# Patient Record
Sex: MALE | Race: WHITE
[De-identification: names, ages, dates, MRNs, and addresses within clinical notes are randomized per-mention and may not be internally consistent; named-entity substitution may affect disease eponyms.]

---

## 2017-04-01 ENCOUNTER — HOSPITAL ENCOUNTER (EMERGENCY)
Dept: HOSPITAL 26 - MED | Age: 32
Discharge: HOME | End: 2017-04-01
Payer: MEDICAID

## 2017-04-01 VITALS — DIASTOLIC BLOOD PRESSURE: 87 MMHG | SYSTOLIC BLOOD PRESSURE: 132 MMHG

## 2017-04-01 VITALS — DIASTOLIC BLOOD PRESSURE: 90 MMHG | SYSTOLIC BLOOD PRESSURE: 152 MMHG

## 2017-04-01 VITALS — HEIGHT: 66 IN | WEIGHT: 190 LBS | BODY MASS INDEX: 30.53 KG/M2

## 2017-04-01 DIAGNOSIS — F10.129: Primary | ICD-10-CM
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
## 2017-04-01 PROCEDURE — 85610 PROTHROMBIN TIME: CPT

## 2017-04-01 PROCEDURE — 85025 COMPLETE CBC W/AUTO DIFF WBC: CPT

## 2017-04-01 PROCEDURE — 99284 EMERGENCY DEPT VISIT MOD MDM: CPT

## 2017-04-01 PROCEDURE — 36415 COLL VENOUS BLD VENIPUNCTURE: CPT

## 2017-04-01 PROCEDURE — 96374 THER/PROPH/DIAG INJ IV PUSH: CPT

## 2017-04-01 PROCEDURE — 96361 HYDRATE IV INFUSION ADD-ON: CPT

## 2017-04-01 PROCEDURE — 80053 COMPREHEN METABOLIC PANEL: CPT

## 2017-04-01 NOTE — NUR
Patient discharged with v/s stable. Written and verbal after care instructions 
given and explained. Patient alert, oriented and verbalized understanding of 
instructions. Ambulatory with steady gait. All questions addressed prior to 
discharge. ID band removed. Patient advised to follow up with PMD. Rx of ATIVAN 
1MG  given. Patient educated on indication of medication including possible 
reaction and side effects. Opportunity to ask questions provided and 
answered.PT STATES HE CALLED HIS MOM, SHE WILL COME AND PICK HIM UP

## 2017-04-01 NOTE — NUR
PATIENT PRESENTS TO ED WITH ALCOHOL WITHDRAWAL SYMPTOMS . PT STATES HE HAD 3 
SEIZURES TODAY WHILE AT HOME, 1 FATHER WAS PRESENT AND 2 MOM WAS PRESENT X 30 
MIN FOR THE 1ST SEIZURE AND UNKNOWN TIME FOR THE 2 OTHERS . TONGUE IS NOT 
SWOLLEN OR RED ON ASSESSMENT AT THE MOMENT. PT STATES HE TAKE ATIVAN 2MG 3X A 
DAY FOR SEIZURE AND HAS BEEN OUT OF MEDICATION FOR 2 WEEKS AND SINCE THEN BEEN 
DRINKING ALCOHOL. PT DENIES N/V/D; SKIN IS PINK/WARM/DRY; AAOX4 WITH EVEN AND 
STEADY GAIT; LUNGS CLEAR BL; HR EVEN AND REGULAR; PT DENIES ANY FEVER, CP, SOB, 
OR COUGH AT THIS TIME; PATIENT STATES PAIN OF 0/10 AT THIS TIME; VSS; PATIENT 
POSITIONED FOR COMFORT; HOB ELEVATED; BEDRAILS UP X2; BED DOWN. ER MD MADE 
AWARE OF PT STATUS.

## 2017-04-01 NOTE — NUR
-------------------------------------------------------------------------------

            *** Note undone in [facility] - 04/01/17 at 0146 by HARINDER ***             

-------------------------------------------------------------------------------

Patient to bed 07.

## 2017-10-15 ENCOUNTER — HOSPITAL ENCOUNTER (INPATIENT)
Dept: HOSPITAL 26 - MED | Age: 32
LOS: 1 days | Discharge: HOME | End: 2017-10-16
Payer: MEDICAID

## 2017-10-15 VITALS — DIASTOLIC BLOOD PRESSURE: 80 MMHG | SYSTOLIC BLOOD PRESSURE: 131 MMHG

## 2017-10-15 VITALS — SYSTOLIC BLOOD PRESSURE: 123 MMHG | DIASTOLIC BLOOD PRESSURE: 82 MMHG

## 2017-10-15 VITALS — WEIGHT: 174.03 LBS | BODY MASS INDEX: 27.97 KG/M2 | HEIGHT: 66 IN

## 2017-10-15 DIAGNOSIS — F41.9: ICD-10-CM

## 2017-10-15 DIAGNOSIS — E87.1: ICD-10-CM

## 2017-10-15 DIAGNOSIS — F10.239: Primary | ICD-10-CM

## 2017-10-15 DIAGNOSIS — E83.42: ICD-10-CM

## 2017-10-15 DIAGNOSIS — R56.9: ICD-10-CM

## 2017-10-15 LAB
ALBUMIN FLD-MCNC: 3.5 G/DL (ref 3.4–5)
ANION GAP SERPL CALCULATED.3IONS-SCNC: 11.6 MMOL/L (ref 8–16)
APPEARANCE UR: CLEAR
AST SERPL-CCNC: 43 U/L (ref 15–37)
BARBITURATES UR QL SCN: (no result) NG/ML
BENZODIAZ UR QL SCN: (no result) NG/ML
BILIRUB SERPL-MCNC: 0.5 MG/DL (ref 0–1)
BILIRUB UR QL STRIP: NEGATIVE
BUN SERPL-MCNC: 6 MG/DL (ref 7–18)
BZE UR QL SCN: (no result) NG/ML
CANNABINOIDS UR QL SCN: (no result) NG/ML
CHLORIDE SERPL-SCNC: 101 MMOL/L (ref 98–107)
CO2 SERPL-SCNC: 30.1 MMOL/L (ref 21–32)
COLOR UR: YELLOW
CREAT SERPL-MCNC: 0.7 MG/DL (ref 0.7–1.3)
ERYTHROCYTE [DISTWIDTH] IN BLOOD BY AUTOMATED COUNT: 18.8 % (ref 11.6–13.7)
GFR SERPL CREATININE-BSD FRML MDRD: 166 ML/MIN (ref 90–?)
GLUCOSE SERPL-MCNC: 106 MG/DL (ref 74–106)
GLUCOSE UR STRIP-MCNC: NEGATIVE MG/DL
HCT VFR BLD AUTO: 32.8 % (ref 36–52)
HGB BLD-MCNC: 9.7 G/DL (ref 12–18)
HGB UR QL STRIP: NEGATIVE
LEUKOCYTE ESTERASE UR QL STRIP: NEGATIVE
LYMPHOCYTES NFR BLD MANUAL: 41 % (ref 20–46)
MCH RBC QN AUTO: 17 PG (ref 27–31)
MCHC RBC AUTO-ENTMCNC: 30 G/DL (ref 33–37)
MCV RBC AUTO: 59 FL (ref 80–94)
MONOCYTES NFR BLD MANUAL: 4 % (ref 5–12)
NITRITE UR QL STRIP: NEGATIVE
OPIATES UR QL SCN: (no result) NG/ML
PCP UR QL SCN: (no result) NG/ML
PH UR STRIP: 5.5 [PH] (ref 5–9)
PLATELET # BLD AUTO: 231 K/UL (ref 140–450)
POTASSIUM SERPL-SCNC: 3.7 MMOL/L (ref 3.5–5.1)
RBC # BLD AUTO: 5.6 MIL/UL (ref 4.2–6.1)
SODIUM SERPL-SCNC: 139 MMOL/L (ref 136–145)
WBC # BLD AUTO: 7.2 K/UL (ref 4.8–10.8)

## 2017-10-15 PROCEDURE — G0482 DRUG TEST DEF 15-21 CLASSES: HCPCS

## 2017-10-15 RX ADMIN — DEXTROSE AND SODIUM CHLORIDE SCH MLS/HR: 5; .9 INJECTION, SOLUTION INTRAVENOUS at 22:25

## 2017-10-15 NOTE — NUR
PATIENT REQUESTED ICE CHIPS. PROVIDED PATIENT WITH 1 CUP OF ICE CHIPS. TOLERATED WELL. 
CONTINUE TO MONITOR PATIENT.

## 2017-10-15 NOTE — NUR
PATIENT IS A 33 Y/O MALE WHO PRESENTS TO THE ED C/O SEIZURES. PT STATES, "I GET 
THEM WHEN I STOP DRINKING ALCOHOL. PT DENIES PAIN, PT REPORTS SEIZURES X1 DAY, 
NO ORAL TRAUMA. PERRLA. PT REPORTS TIGHTNESS 7/10 CHEST PAIN THAT DOES NOT 
RADIATE. PT REPORTS DIARRHEA X1 DAY, DENIES N/V. PT AAOX4, RR EVEN/UNLABORED. 
PT REPOSITIONED FOR COMFORT, BED IN LOWEST POSITION. ER MD DR. PINO NOTIFIED. 
WILL CONTINUE TO MONITOR. 

-------------------------------------------------------------------------------

Addendum: 10/15/17 at 2211 by MEDDCV

-------------------------------------------------------------------------------

PATIENT IS A 33 Y/O MALE WHO PRESENTS TO THE ED C/O SEIZURES. PT STATES, "I GET 
THEM WHEN I STOP DRINKING ALCOHOL. PT DENIES PAIN, PT REPORTS SEIZURES X1 DAY, 
NO ORAL TRAUMA. PERRLA. SEIZURE PADS, X2 BEDRAILS UP. PT REPORTS TIGHTNESS 7/10 
CHEST PAIN THAT DOES NOT RADIATE. PT REPORTS DIARRHEA X1 DAY, DENIES N/V. PT 
AAOX4, RR EVEN/UNLABORED. PT REPOSITIONED FOR COMFORT, BED IN LOWEST POSITION. 
ER MD DR. PINO NOTIFIED. WILL CONTINUE TO MONITOR.

## 2017-10-15 NOTE — NUR
RECEIVED REPORT FROM VALERIE PRESCOTT. PATIENT ARRIVED FROM ER VIA [facility] WITH VALERIE RN, CHLOE RN, AND 
SIMONE EMT. PATIENT IS AWAKE, ALERT, AND ORIENTED. PATIENT SELF AMBULATED FROM ER GURNEY TO 
ICU BED 2 WITH NO SIGNS OF SOB OR DISTRESS NOTED. GAIT STEADY. ORIENTED PATIENT TO UNIT AND 
PLAN OF CARE VITAL SIGNS STABLE. THERE IS A #20 IN THE RIGHT AC. SITE IS DRY, INTACT, AND 
ASYMPTOMATIC. EXPLAINED PLAN OF CARE TONIGHT TO INCLUDE CARDIAC MONITORING, V/S Q2H, AND 
MEDICATION ADMINISTRATION. PATIENT VERBALIZED UNDERSTANDING. HOB AT 30 DEGREES WITH BED IN 
LOW POSITION. WILL CONTINUE TO MONITOR PATIENT.

## 2017-10-15 NOTE — NUR
Patient will be admitted to care of DR. CHAPIN. Admited to ICU.  Will go to room 
ICU 2. Belongings list completed.  Report to TOMMY PRESCOTT.

## 2017-10-15 NOTE — NUR
Patient noted to have existing wounds upon arrival to ER.  Photos taken of 
wound and placed in chart.  Wound covered with dressing.  Physician informed.

## 2017-10-16 VITALS — DIASTOLIC BLOOD PRESSURE: 74 MMHG | SYSTOLIC BLOOD PRESSURE: 134 MMHG

## 2017-10-16 VITALS — SYSTOLIC BLOOD PRESSURE: 118 MMHG | DIASTOLIC BLOOD PRESSURE: 61 MMHG

## 2017-10-16 VITALS — DIASTOLIC BLOOD PRESSURE: 60 MMHG | SYSTOLIC BLOOD PRESSURE: 124 MMHG

## 2017-10-16 VITALS — DIASTOLIC BLOOD PRESSURE: 66 MMHG | SYSTOLIC BLOOD PRESSURE: 111 MMHG

## 2017-10-16 VITALS — SYSTOLIC BLOOD PRESSURE: 118 MMHG | DIASTOLIC BLOOD PRESSURE: 66 MMHG

## 2017-10-16 VITALS — DIASTOLIC BLOOD PRESSURE: 66 MMHG | SYSTOLIC BLOOD PRESSURE: 114 MMHG

## 2017-10-16 VITALS — DIASTOLIC BLOOD PRESSURE: 77 MMHG | SYSTOLIC BLOOD PRESSURE: 128 MMHG

## 2017-10-16 VITALS — DIASTOLIC BLOOD PRESSURE: 86 MMHG | SYSTOLIC BLOOD PRESSURE: 148 MMHG

## 2017-10-16 VITALS — SYSTOLIC BLOOD PRESSURE: 132 MMHG | DIASTOLIC BLOOD PRESSURE: 92 MMHG

## 2017-10-16 VITALS — SYSTOLIC BLOOD PRESSURE: 121 MMHG | DIASTOLIC BLOOD PRESSURE: 86 MMHG

## 2017-10-16 LAB
ANION GAP SERPL CALCULATED.3IONS-SCNC: 7.5 MMOL/L (ref 8–16)
BUN SERPL-MCNC: 6 MG/DL (ref 7–18)
CHLORIDE SERPL-SCNC: 106 MMOL/L (ref 98–107)
CO2 SERPL-SCNC: 31.2 MMOL/L (ref 21–32)
CREAT SERPL-MCNC: 0.6 MG/DL (ref 0.7–1.3)
ERYTHROCYTE [DISTWIDTH] IN BLOOD BY AUTOMATED COUNT: 19.5 % (ref 11.6–13.7)
GFR SERPL CREATININE-BSD FRML MDRD: 198 ML/MIN (ref 90–?)
GLUCOSE SERPL-MCNC: 92 MG/DL (ref 74–106)
HCT VFR BLD AUTO: 31.1 % (ref 36–52)
HGB BLD-MCNC: 8.9 G/DL (ref 12–18)
LYMPHOCYTES NFR BLD MANUAL: 32 % (ref 20–46)
MAGNESIUM SERPL-MCNC: 1.7 MG/DL (ref 1.8–2.4)
MCH RBC QN AUTO: 17 PG (ref 27–31)
MCHC RBC AUTO-ENTMCNC: 29 G/DL (ref 33–37)
MCV RBC AUTO: 60 FL (ref 80–94)
MONOCYTES NFR BLD MANUAL: 6 % (ref 5–12)
PHOSPHATE SERPL-MCNC: 4.1 MG/DL (ref 2.5–4.9)
PLATELET # BLD AUTO: 222 K/UL (ref 140–450)
POTASSIUM SERPL-SCNC: 3.7 MMOL/L (ref 3.5–5.1)
RBC # BLD AUTO: 5.21 MIL/UL (ref 4.2–6.1)
SODIUM SERPL-SCNC: 141 MMOL/L (ref 136–145)
WBC # BLD AUTO: 4.7 K/UL (ref 4.8–10.8)

## 2017-10-16 RX ADMIN — DEXTROSE AND SODIUM CHLORIDE SCH MLS/HR: 5; .9 INJECTION, SOLUTION INTRAVENOUS at 11:09

## 2017-10-16 RX ADMIN — DEXTROSE AND SODIUM CHLORIDE SCH MLS/HR: 5; .9 INJECTION, SOLUTION INTRAVENOUS at 16:21

## 2017-10-16 NOTE — NUR
10/16/17 

RD INITIAL ASSESSMENT COMPLETED



PLEASE REFER TO NUTRITION ASSESSMENT UNDER CARE ACTIVITY FOR ESTIMATED NUTRITIONAL NEEDS. 



1. CONTINUE REGULAR DIET

2. ENCOURAGE INCREASED PO INTAKE TO TOLERANCE

3. RD TO FOLLOW-UP 2-3 DAYS, HIGH RISK 



RACHELE SLATER RD

## 2017-10-16 NOTE — NUR
PATIENT HAS BEEN SCREENED AND CATEGORIZED AS HIGH NUTRITION RISK. PATIENT WILL BE SEEN 
WITHIN 1-2 DAYS OF ADMISSION.



10/16/17-10/17/17



RACHELE SLATER RD

## 2017-10-16 NOTE — NUR
PT IS STABLE. IV'S DC'D WITH CANNULA INTACT. WRIST BAND REMOVED. DISCHARGE INSTRUCTION GIVEN 
AND SIGNED BY PT'S WIFE. ALL PERSONAL BELONGINGS TAKEN BY PT AND LEFT WITH WIFE.

## 2017-10-16 NOTE — NUR
PAOLA NOTE

AS PER TINY GHOTRA OF [facility] PH# [phone], REVIEWS AND DISCHARGE NEEDS SHOULD ONLY BE 
SENT TO [facility] AND NOT TO [facility].

INITIAL REVIEW FAXED TO [facility] [phone] PH# [phone] PAOLA WILLIAM EXT [id number]

## 2017-10-16 NOTE — NUR
RECEIVED A REPORT FROM GENIE MORALES. PT IS ASLEEP AND UNABLE TO FOLLOW COMMANDS AT THIS 
TIME. ON O2 2L/MIN VIA N/C. NO S/SX OF RESPIRATORY DISTRESS AND FLACC 0 NOTED. SR ON THE 
MONITOR. SKIN WARM TO TOUCH. NO EDEMA. APPLIED SCD TO BLE FOR VTE PROPHYLAXIS. SAFETY 
PRECAUTION, BED IN LOW POSITION. CALL LIGHT WITHIN REACH. WILL CONTINUE TO MONITOR.

## 2017-10-16 NOTE — NUR
PATIENT SLEEPING COMFORTABLY IN BED WITH NO SIGNS OF DISTRESS OR DISCOMFORT NOTED. WILL 
CONTINUE TO MONITOR PATIENT.

## 2017-10-16 NOTE — NUR
PATIENT SLEEPING IN BED. BREATHING EVEN AND UNLABORED. HOB AT 30 DEGREES WITH BED IN LOW 
POSITION. SEIZURE PRECAUTIONS IN PLACE. CONTINUE TO MONITOR PATIENT.

## 2018-05-04 ENCOUNTER — HOSPITAL ENCOUNTER (EMERGENCY)
Dept: HOSPITAL 26 - MED | Age: 33
Discharge: HOME | End: 2018-05-04
Payer: MEDICAID

## 2018-05-04 VITALS — SYSTOLIC BLOOD PRESSURE: 134 MMHG | DIASTOLIC BLOOD PRESSURE: 93 MMHG

## 2018-05-04 VITALS — HEIGHT: 64 IN | WEIGHT: 182 LBS | BODY MASS INDEX: 31.07 KG/M2

## 2018-05-04 VITALS — SYSTOLIC BLOOD PRESSURE: 128 MMHG | DIASTOLIC BLOOD PRESSURE: 74 MMHG

## 2018-05-04 DIAGNOSIS — F10.129: Primary | ICD-10-CM

## 2018-05-04 DIAGNOSIS — F17.210: ICD-10-CM

## 2018-05-04 DIAGNOSIS — F41.9: ICD-10-CM

## 2018-05-04 DIAGNOSIS — G40.909: ICD-10-CM

## 2018-05-04 LAB
ALBUMIN FLD-MCNC: 3.9 G/DL (ref 3.4–5)
ANION GAP SERPL CALCULATED.3IONS-SCNC: 18.1 MMOL/L (ref 8–16)
APAP SERPL-MCNC: < 0.5 UG/ML (ref 10–30)
APPEARANCE UR: CLEAR
AST SERPL-CCNC: 91 U/L (ref 15–37)
BARBITURATES UR QL SCN: (no result) NG/ML
BENZODIAZ UR QL SCN: (no result) NG/ML
BILIRUB SERPL-MCNC: 0.6 MG/DL (ref 0–1)
BILIRUB UR QL STRIP: NEGATIVE
BUN SERPL-MCNC: 6 MG/DL (ref 7–18)
BZE UR QL SCN: (no result) NG/ML
CANNABINOIDS UR QL SCN: (no result) NG/ML
CHLORIDE SERPL-SCNC: 101 MMOL/L (ref 98–107)
CO2 SERPL-SCNC: 26.9 MMOL/L (ref 21–32)
COLOR UR: YELLOW
CREAT SERPL-MCNC: 0.7 MG/DL (ref 0.7–1.3)
ERYTHROCYTE [DISTWIDTH] IN BLOOD BY AUTOMATED COUNT: 22.2 % (ref 11.6–13.7)
GFR SERPL CREATININE-BSD FRML MDRD: 165 ML/MIN (ref 90–?)
GLUCOSE SERPL-MCNC: 103 MG/DL (ref 74–106)
GLUCOSE UR STRIP-MCNC: NEGATIVE MG/DL
HCT VFR BLD AUTO: 30.4 % (ref 36–52)
HGB BLD-MCNC: 8.8 G/DL (ref 12–18)
HGB UR QL STRIP: NEGATIVE
LEUKOCYTE ESTERASE UR QL STRIP: NEGATIVE
LIPASE SERPL-CCNC: 278 U/L (ref 73–393)
LYMPHOCYTES NFR BLD MANUAL: 46 % (ref 20–46)
MCH RBC QN AUTO: 15 PG (ref 27–31)
MCHC RBC AUTO-ENTMCNC: 29 G/DL (ref 33–37)
MCV RBC AUTO: 52.1 FL (ref 80–94)
MONOCYTES NFR BLD MANUAL: 6 % (ref 5–12)
NITRITE UR QL STRIP: NEGATIVE
OPIATES UR QL SCN: (no result) NG/ML
PCP UR QL SCN: (no result) NG/ML
PH UR STRIP: 5.5 [PH] (ref 5–9)
PLATELET # BLD AUTO: 205 K/UL (ref 140–450)
POTASSIUM SERPL-SCNC: 4 MMOL/L (ref 3.5–5.1)
RBC # BLD AUTO: 5.83 MIL/UL (ref 4.2–6.1)
SALICYLATES SERPL-MCNC: < 2.8 MG/DL (ref 2.8–20)
SODIUM SERPL-SCNC: 142 MMOL/L (ref 136–145)
WBC # BLD AUTO: 3.5 K/UL (ref 4.8–10.8)

## 2018-05-04 PROCEDURE — 36415 COLL VENOUS BLD VENIPUNCTURE: CPT

## 2018-05-04 PROCEDURE — 93005 ELECTROCARDIOGRAM TRACING: CPT

## 2018-05-04 PROCEDURE — G0482 DRUG TEST DEF 15-21 CLASSES: HCPCS

## 2018-05-04 PROCEDURE — 71045 X-RAY EXAM CHEST 1 VIEW: CPT

## 2018-05-04 PROCEDURE — 81003 URINALYSIS AUTO W/O SCOPE: CPT

## 2018-05-04 PROCEDURE — 80053 COMPREHEN METABOLIC PANEL: CPT

## 2018-05-04 PROCEDURE — 84484 ASSAY OF TROPONIN QUANT: CPT

## 2018-05-04 PROCEDURE — 85025 COMPLETE CBC W/AUTO DIFF WBC: CPT

## 2018-05-04 PROCEDURE — 96360 HYDRATION IV INFUSION INIT: CPT

## 2018-05-04 PROCEDURE — 80305 DRUG TEST PRSMV DIR OPT OBS: CPT

## 2018-05-04 PROCEDURE — G0480 DRUG TEST DEF 1-7 CLASSES: HCPCS

## 2018-05-04 PROCEDURE — 99285 EMERGENCY DEPT VISIT HI MDM: CPT

## 2018-05-04 PROCEDURE — 83690 ASSAY OF LIPASE: CPT
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
## 2018-05-04 NOTE — NUR
Patient discharged with v/s stable. Written and verbal after care instructions 
given and explained. 

Patient alert, oriented and verbalized understanding of instructions. 
Ambulatory with steady gait. All questions addressed prior to discharge. ID 
band removed. Patient advised to follow up with PMD. Rx of Ativan given. 
Patient educated on indication of medication including possible reaction and 
side effects. Opportunity to ask questions provided and answered.

## 2020-01-02 ENCOUNTER — HOSPITAL ENCOUNTER (EMERGENCY)
Dept: HOSPITAL 26 - MED | Age: 35
LOS: 1 days | Discharge: HOME | End: 2020-01-03
Payer: MEDICAID

## 2020-01-02 VITALS — SYSTOLIC BLOOD PRESSURE: 158 MMHG | DIASTOLIC BLOOD PRESSURE: 83 MMHG

## 2020-01-02 VITALS — HEIGHT: 66 IN | WEIGHT: 219 LBS | BODY MASS INDEX: 35.2 KG/M2

## 2020-01-02 DIAGNOSIS — F17.210: ICD-10-CM

## 2020-01-02 DIAGNOSIS — Z79.899: ICD-10-CM

## 2020-01-02 DIAGNOSIS — R10.13: Primary | ICD-10-CM

## 2020-01-02 DIAGNOSIS — R05: ICD-10-CM

## 2020-01-02 DIAGNOSIS — K44.9: ICD-10-CM

## 2020-01-02 PROCEDURE — 71111 X-RAY EXAM RIBS/CHEST4/> VWS: CPT

## 2020-01-02 PROCEDURE — 99284 EMERGENCY DEPT VISIT MOD MDM: CPT

## 2020-01-02 PROCEDURE — 96372 THER/PROPH/DIAG INJ SC/IM: CPT

## 2020-01-02 PROCEDURE — 74150 CT ABDOMEN W/O CONTRAST: CPT

## 2020-01-02 NOTE — NUR
PATIENT ASSESSMENT COMPLETED FOR RIB PAIN. PATIENT WIFE AT CHAIRSIDE. PATIEN 
SITTING UP NO DISTRESS AT THIS TIME.

## 2020-01-03 VITALS — SYSTOLIC BLOOD PRESSURE: 148 MMHG | DIASTOLIC BLOOD PRESSURE: 80 MMHG

## 2020-01-03 NOTE — NUR
Patient discharged with v/s stable. Written and verbal after care instructions 
given and explained. 

Patient alert, oriented and verbalized understanding of instructions. 
Ambulatory with steady gait. All questions addressed prior to discharge. ID 
band removed. Patient advised to follow up with PMD. Rx of PRIOLSEC, 
PROMETHAZINE given. Patient educated on indication of medication including 
possible reaction and side effects. Opportunity to ask questions provided and 
answered.

## 2020-04-22 ENCOUNTER — HOSPITAL ENCOUNTER (EMERGENCY)
Dept: HOSPITAL 26 - MED | Age: 35
Discharge: HOME | End: 2020-04-22
Payer: MEDICAID

## 2020-04-22 VITALS — BODY MASS INDEX: 30.73 KG/M2 | WEIGHT: 180 LBS | HEIGHT: 64 IN

## 2020-04-22 VITALS — SYSTOLIC BLOOD PRESSURE: 123 MMHG | DIASTOLIC BLOOD PRESSURE: 68 MMHG

## 2020-04-22 DIAGNOSIS — Z79.899: ICD-10-CM

## 2020-04-22 DIAGNOSIS — F10.10: Primary | ICD-10-CM

## 2020-04-22 DIAGNOSIS — F14.90: ICD-10-CM

## 2020-04-22 DIAGNOSIS — F12.90: ICD-10-CM

## 2020-04-22 PROCEDURE — 99283 EMERGENCY DEPT VISIT LOW MDM: CPT
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
## 2022-03-02 ENCOUNTER — HOSPITAL ENCOUNTER (INPATIENT)
Dept: HOSPITAL 26 - MED | Age: 37
LOS: 1 days | Discharge: HOME | End: 2022-03-03
Attending: FAMILY MEDICINE | Admitting: FAMILY MEDICINE
Payer: MEDICAID

## 2022-03-02 VITALS — DIASTOLIC BLOOD PRESSURE: 63 MMHG | SYSTOLIC BLOOD PRESSURE: 115 MMHG

## 2022-03-02 VITALS — WEIGHT: 189 LBS | BODY MASS INDEX: 31.49 KG/M2 | HEIGHT: 65 IN

## 2022-03-02 VITALS — SYSTOLIC BLOOD PRESSURE: 141 MMHG | DIASTOLIC BLOOD PRESSURE: 94 MMHG

## 2022-03-02 VITALS — SYSTOLIC BLOOD PRESSURE: 97 MMHG | DIASTOLIC BLOOD PRESSURE: 67 MMHG

## 2022-03-02 VITALS — DIASTOLIC BLOOD PRESSURE: 59 MMHG | SYSTOLIC BLOOD PRESSURE: 105 MMHG

## 2022-03-02 VITALS — DIASTOLIC BLOOD PRESSURE: 62 MMHG | SYSTOLIC BLOOD PRESSURE: 107 MMHG

## 2022-03-02 DIAGNOSIS — Z98.52: ICD-10-CM

## 2022-03-02 DIAGNOSIS — Z79.899: ICD-10-CM

## 2022-03-02 DIAGNOSIS — F10.239: ICD-10-CM

## 2022-03-02 DIAGNOSIS — F17.210: ICD-10-CM

## 2022-03-02 DIAGNOSIS — G92.9: ICD-10-CM

## 2022-03-02 DIAGNOSIS — R74.01: ICD-10-CM

## 2022-03-02 DIAGNOSIS — G40.909: ICD-10-CM

## 2022-03-02 DIAGNOSIS — E87.6: ICD-10-CM

## 2022-03-02 DIAGNOSIS — Z20.822: ICD-10-CM

## 2022-03-02 DIAGNOSIS — F10.229: Primary | ICD-10-CM

## 2022-03-02 DIAGNOSIS — F41.9: ICD-10-CM

## 2022-03-02 DIAGNOSIS — N40.0: ICD-10-CM

## 2022-03-02 DIAGNOSIS — E86.0: ICD-10-CM

## 2022-03-02 LAB
ALBUMIN FLD-MCNC: 3.5 G/DL (ref 3.4–5)
AMYLASE SERPL-CCNC: 43 U/L (ref 25–115)
ANION GAP SERPL CALCULATED.3IONS-SCNC: 13.7 MMOL/L (ref 8–16)
APAP SERPL-MCNC: < 0.5 UG/ML (ref 10–30)
APPEARANCE UR: CLEAR
AST SERPL-CCNC: 248 U/L (ref 15–37)
BARBITURATES UR QL SCN: NEGATIVE NG/ML
BASOPHILS # BLD AUTO: 0 K/UL (ref 0–0.22)
BASOPHILS NFR BLD AUTO: 0.4 % (ref 0–2)
BENZODIAZ UR QL SCN: POSITIVE NG/ML
BILIRUB SERPL-MCNC: 0.8 MG/DL (ref 0–1)
BILIRUB UR QL STRIP: NEGATIVE
BUN SERPL-MCNC: 3 MG/DL (ref 7–18)
BZE UR QL SCN: POSITIVE NG/ML
CANNABINOIDS UR QL SCN: NEGATIVE NG/ML
CHLORIDE SERPL-SCNC: 103 MMOL/L (ref 98–107)
CHOLEST/HDLC SERPL: 2.6 {RATIO} (ref 1–4.5)
CO2 SERPL-SCNC: 26.4 MMOL/L (ref 21–32)
COLOR UR: YELLOW
CREAT SERPL-MCNC: 0.6 MG/DL (ref 0.6–1.3)
EOSINOPHIL # BLD AUTO: 0.1 K/UL (ref 0–0.4)
EOSINOPHIL NFR BLD AUTO: 2.3 % (ref 0–4)
ERYTHROCYTE [DISTWIDTH] IN BLOOD BY AUTOMATED COUNT: 27.6 % (ref 11.6–13.7)
GFR SERPL CREATININE-BSD FRML MDRD: 193 ML/MIN (ref 90–?)
GLUCOSE SERPL-MCNC: 131 MG/DL (ref 74–106)
GLUCOSE UR STRIP-MCNC: NEGATIVE MG/DL
HCT VFR BLD AUTO: 46.3 % (ref 36–52)
HDLC SERPL-MCNC: 55 MG/DL (ref 40–60)
HGB BLD-MCNC: 14.6 G/DL (ref 12–18)
HGB UR QL STRIP: NEGATIVE
LDLC SERPL CALC-MCNC: 65 MG/DL (ref 60–100)
LEUKOCYTE ESTERASE UR QL STRIP: NEGATIVE
LIPASE SERPL-CCNC: 118 U/L (ref 73–393)
LYMPHOCYTES # BLD AUTO: 1.8 K/UL (ref 2–11.5)
LYMPHOCYTES NFR BLD AUTO: 35.2 % (ref 20.5–51.1)
MAGNESIUM SERPL-MCNC: 2.1 MG/DL (ref 1.8–2.4)
MCH RBC QN AUTO: 22 PG (ref 27–31)
MCHC RBC AUTO-ENTMCNC: 32 G/DL (ref 33–37)
MCV RBC AUTO: 70.5 FL (ref 80–94)
MONOCYTES # BLD AUTO: 0.4 K/UL (ref 0.8–1)
MONOCYTES NFR BLD AUTO: 7.5 % (ref 1.7–9.3)
NEUTROPHILS # BLD AUTO: 2.8 K/UL (ref 1.8–7.7)
NEUTROPHILS NFR BLD AUTO: 54.6 % (ref 42.2–75.2)
NITRITE UR QL STRIP: NEGATIVE
OPIATES UR QL SCN: NEGATIVE NG/ML
PCP UR QL SCN: NEGATIVE NG/ML
PH UR STRIP: 6 [PH] (ref 5–9)
PHOSPHATE SERPL-MCNC: 4.4 MG/DL (ref 2.5–4.9)
PLATELET # BLD AUTO: 190 K/UL (ref 140–450)
POTASSIUM SERPL-SCNC: 3.1 MMOL/L (ref 3.5–5.1)
PROTHROMBIN TIME: 11.5 SECS (ref 10.8–13.4)
RBC # BLD AUTO: 6.58 MIL/UL (ref 4.2–6.1)
SALICYLATES SERPL-MCNC: < 2.8 MG/DL (ref 2.8–20)
SODIUM SERPL-SCNC: 140 MMOL/L (ref 136–145)
T4 FREE SERPL-MCNC: 0.6 NG/DL (ref 0.76–1.46)
TRIGL SERPL-MCNC: 118 MG/DL (ref 30–150)
TSH SERPL DL<=0.05 MIU/L-ACNC: 1.13 UIU/ML (ref 0.34–3.74)
WBC # BLD AUTO: 5.2 K/UL (ref 4.8–10.8)

## 2022-03-02 PROCEDURE — A9153 MULTI-VITAMIN NOS: HCPCS

## 2022-03-02 PROCEDURE — G0482 DRUG TEST DEF 15-21 CLASSES: HCPCS

## 2022-03-02 PROCEDURE — G0480 DRUG TEST DEF 1-7 CLASSES: HCPCS

## 2022-03-02 RX ADMIN — FOLIC ACID SCH MLS/HR: 5 INJECTION, SOLUTION INTRAMUSCULAR; INTRAVENOUS; SUBCUTANEOUS at 10:24

## 2022-03-02 RX ADMIN — SODIUM CHLORIDE SCH MLS/HR: 9 INJECTION, SOLUTION INTRAVENOUS at 09:35

## 2022-03-02 RX ADMIN — SODIUM CHLORIDE SCH MLS/HR: 9 INJECTION, SOLUTION INTRAVENOUS at 16:20

## 2022-03-02 RX ADMIN — PANTOPRAZOLE SODIUM SCH MG: 40 TABLET, DELAYED RELEASE ORAL at 09:28

## 2022-03-02 NOTE — NUR
PATIENT IS AWAKE, RESTING IN BED, NO SIGN OF DISTRESS NOTED. PRECAUTION IN PLACE. CALL LIGHT 
WITHIN REACH. WILL CONTINUE TO MONITOR.

## 2022-03-02 NOTE — NUR
PATIENT IS AWAKE, EATING BREAKFAST. SCHEDULE MEDICATIONS GIVEN WITH EDUCATION, PATIENT 
VERBALIZED UNDERSTANDING. PATIENT TOLERATED WELL. NO SIGN OF DISTRESS NOTED. PRECAUTION IN 
PLACE. CALL LIGHT WITHIN REACH. WILL CONTINUE TO MONITOR.

## 2022-03-02 NOTE — NUR
PATIENT HAS BEEN SCREENED AND CATEGORIZED AS MODERATE NUTRITION RISK. PATIENT WILL BE SEEN 
WITHIN 3-5 DAYS OF ADMISSION.



3/2/223/6/22



FREDDIE GARZA RD

## 2022-03-02 NOTE — NUR
RECEIVED PATIENT FROM NIGHT SHIFT NURSE FOR CONTINUITY OF CARE. PATIENT IS SLEEPING, CHEST 
RISE AND FALL, AROUSABLE TO VOICE, A/O X3. RESPIRATORY EVEN AND UNLABORED, ON ROOM AIR. NO 
SIGN OF DISTRESS NOTED. SKIN WARM, DRY, NON DIAPHORETIC. IV ON RIGHT AC 20G, INTACT AND 
PATENT, SALINE LOCK. DENIES ANY PAIN OR DISCOMFORT. PLAN OF CARE DISCUSSED, PATIENT 
VERBALIZED UNDERSTANDING. PRECAUTION IN PLACE. CALL LIGHT WITHIN REACH. WILL CONTINUE TO 
MONITOR.

## 2022-03-02 NOTE — NUR
38 Y/O MALE BIB PARENTS, C/O ETOH/ ALCOHOL WITHDRAWAL X1 DAY. PATIENT PRESENTS 
TO ED WITH WEAKNESS. PT STATES HE HAS BEEN TRYING TO QUIT ALCOHOL, BONZOS, AND 
COCAINE AND HAS BEEN SOBER FOR 2 DAYS; HE EXPRESSED A DESIRE TO QUIT HIS 
DEPENDENCY; PT CLAIMS HE HAS BEEN SEIZING FROM WITHDRAWALS SINCE THIS MORNING 
AND HIS PARENTS HAVE BEEN GIVING HIM ALCOHOL TO STOP THE SEIZING. PT STATES HE 
JUST KEEPS WAKING UP IN DIFFERENT PARTS OF THE HOUSE PRESSUMABLY AFTER SEIZING. 
DENIES N/V/D; SKIN IS PINK/WARM/DRY; AAOX4 WITH A WEAK GAIT; LUNGS CLEAR BL; HR 
EVEN AND REGULAR; PT DENIES ANY FEVER, CP, SOB, OR COUGH AT THIS TIME; PATIENT 
STATES PAIN OF 0/10 AT THIS TIME; VSS; PATIENT POSITIONED FOR COMFORT; HOB 
ELEVATED; BEDRAILS UP X2; BED DOWN. ER MD MADE AWARE OF PT STATUS. PT STATES 
HIS NORMAL CONSUMPTION OF ALCOHOL IS 20 BEERS/DAY.



HX: "DIFFICULTY URINATING"

NKDA

MED: TAMSULOSIN

## 2022-03-02 NOTE — NUR
PATIENT IS SLEEPING, CHEST RISE AND FALL, NO SIGN OF DISTRESS NOTED. PRECAUTION IN PLACE. 
CALL LIGHT WITHIN REACH. WILL CONTINUE TO MONITOR.

## 2022-03-02 NOTE — NUR
PATIENT AMBULATE TO BATHROOM INDEPENDENTLY WITH STEADY GAIT. NO SIGN OF DISTRESS NOTED. 
PRECAUTION IN PLACE, CALL LIGHT WITHIN REACH. WILL CONTINUE TO MONITOR.

## 2022-03-02 NOTE — NUR
DC PLANNIN YRS OLD MALE PATIENT WAS ADMITTED FROM HOME WITH A DX OF ALCOHOL WITHDRAWAL. PATIENT HAS 
A HX OF BPH, SEIZURE, ALCOHOL AND COCAINE ABUSE. RAPID COVID TEST NEGATIVE. ADMINISTERED IVF 
AND  BANANA BAG .  TO EVALUATE FOR ALCOHOL AND DRUG ABUSE. DC PLAN TO GO HOME 
WHEN STABLE. CM TO FOLLOW

## 2022-03-02 NOTE — NUR
PATIENT IS SLEEPING, CHEST RISE AND FALL NOTED, AROUSABLE TO VOICE. NO SIGN OF DISTRESS 
NOTED. PRECAUTION IN PLACE. CALL LIGHT WITHIN REACH. WILL CONTINUE TO MONITOR.

## 2022-03-02 NOTE — NUR
PATIENT IS SLEEPING, CHEST RISE AND FALL, AROUSABLE TO VOICE. NO SIGN OF DISTRESS NOTED. 
PRECAUTION IN PLACE. CALL LIGHT WITHIN REACH. WILL CONTINUE TO MONITOR.

## 2022-03-02 NOTE — NUR
Patient will be admitted to care of DR COLOR[place]O. Admited to TELE.  Will go to room 
124B. Belongings list completed.  Report to TAY PRESCOTT.

## 2022-03-03 VITALS — DIASTOLIC BLOOD PRESSURE: 88 MMHG | SYSTOLIC BLOOD PRESSURE: 118 MMHG

## 2022-03-03 VITALS — SYSTOLIC BLOOD PRESSURE: 117 MMHG | DIASTOLIC BLOOD PRESSURE: 73 MMHG

## 2022-03-03 LAB
ANION GAP SERPL CALCULATED.3IONS-SCNC: 10.2 MMOL/L (ref 8–16)
BASOPHILS # BLD AUTO: 0 K/UL (ref 0–0.22)
BASOPHILS NFR BLD AUTO: 0.2 % (ref 0–2)
BUN SERPL-MCNC: 6 MG/DL (ref 7–18)
CHLORIDE SERPL-SCNC: 106 MMOL/L (ref 98–107)
CO2 SERPL-SCNC: 29.4 MMOL/L (ref 21–32)
CREAT SERPL-MCNC: 0.7 MG/DL (ref 0.6–1.3)
EOSINOPHIL # BLD AUTO: 0.2 K/UL (ref 0–0.4)
EOSINOPHIL NFR BLD AUTO: 3.7 % (ref 0–4)
ERYTHROCYTE [DISTWIDTH] IN BLOOD BY AUTOMATED COUNT: 27.6 % (ref 11.6–13.7)
GFR SERPL CREATININE-BSD FRML MDRD: 161 ML/MIN (ref 90–?)
GLUCOSE SERPL-MCNC: 93 MG/DL (ref 74–106)
HCT VFR BLD AUTO: 43.3 % (ref 36–52)
HGB BLD-MCNC: 13.8 G/DL (ref 12–18)
LYMPHOCYTES # BLD AUTO: 2 K/UL (ref 2–11.5)
LYMPHOCYTES NFR BLD AUTO: 33.3 % (ref 20.5–51.1)
MCH RBC QN AUTO: 23 PG (ref 27–31)
MCHC RBC AUTO-ENTMCNC: 32 G/DL (ref 33–37)
MCV RBC AUTO: 70.7 FL (ref 80–94)
MONOCYTES # BLD AUTO: 0.5 K/UL (ref 0.8–1)
MONOCYTES NFR BLD AUTO: 7.6 % (ref 1.7–9.3)
NEUTROPHILS # BLD AUTO: 3.3 K/UL (ref 1.8–7.7)
NEUTROPHILS NFR BLD AUTO: 55.2 % (ref 42.2–75.2)
PLATELET # BLD AUTO: 186 K/UL (ref 140–450)
POTASSIUM SERPL-SCNC: 3.6 MMOL/L (ref 3.5–5.1)
RBC # BLD AUTO: 6.12 MIL/UL (ref 4.2–6.1)
SODIUM SERPL-SCNC: 142 MMOL/L (ref 136–145)
T3RU NFR SERPL: 32 % (ref 24–39)
T4 SERPL-MCNC: 5.2 UG/DL (ref 4.5–12)
WBC # BLD AUTO: 6 K/UL (ref 4.8–10.8)

## 2022-03-03 RX ADMIN — PANTOPRAZOLE SODIUM SCH MG: 40 TABLET, DELAYED RELEASE ORAL at 09:13

## 2022-03-03 RX ADMIN — SODIUM CHLORIDE SCH MLS/HR: 9 INJECTION, SOLUTION INTRAVENOUS at 00:20

## 2022-03-03 RX ADMIN — SODIUM CHLORIDE SCH MLS/HR: 9 INJECTION, SOLUTION INTRAVENOUS at 08:20

## 2022-03-03 RX ADMIN — FOLIC ACID SCH MLS/HR: 5 INJECTION, SOLUTION INTRAMUSCULAR; INTRAVENOUS; SUBCUTANEOUS at 09:31

## 2022-03-03 NOTE — NUR
PT GAVE A ANOTHER NUMBER TO CALL EVELIN , CALLED AND SPOKE WITH EVELIN (FATHER) 
MADE AWARE THAT PT IS FOR DISCHARGE AND PER PT REQUEST IF HE CAN PICK HIM UP. EVELIN TO PICK 
UP PT IN 1-2HRS. PT AWARE.

## 2022-03-03 NOTE — NUR
PT DISCHARGED TO HOME WITH BELONGINGS AND PAPERWORKS, PICKED UP BY FATHER EVELIN. PT IN 
STABLE CONDITION.

## 2022-03-03 NOTE — NUR
RECEIVED REPORT FROM NIGHT SHIFT NURSE FOR CONTINUITY OF CARE. PT ASLEEP IN BED. BREATHING 
SYMMETRICAL. FLACC 0. CALL LIGHT WITHIN REACH. ALL SAFETY MEASURES IN PLACE.

## 2022-03-03 NOTE — NUR
PT HAS ORDER FOR DISCHARGE IN PLACE. PT STATED TO CALL FATHER TO PICK HIM UP. CALLED EVELIN 
(FATHER) TWICE  DIDN'T ANSWER, LEFT MESSAGE. WILL TRY AGAIN

## 2022-05-01 ENCOUNTER — HOSPITAL ENCOUNTER (EMERGENCY)
Dept: HOSPITAL 26 - MED | Age: 37
Discharge: HOME | End: 2022-05-01
Payer: MEDICAID

## 2022-05-01 VITALS — DIASTOLIC BLOOD PRESSURE: 78 MMHG | SYSTOLIC BLOOD PRESSURE: 122 MMHG

## 2022-05-01 VITALS — SYSTOLIC BLOOD PRESSURE: 110 MMHG | DIASTOLIC BLOOD PRESSURE: 83 MMHG

## 2022-05-01 VITALS — BODY MASS INDEX: 32.99 KG/M2 | HEIGHT: 65 IN | WEIGHT: 198 LBS

## 2022-05-01 DIAGNOSIS — Z79.899: ICD-10-CM

## 2022-05-01 DIAGNOSIS — F13.20: ICD-10-CM

## 2022-05-01 DIAGNOSIS — F10.10: Primary | ICD-10-CM

## 2022-05-01 DIAGNOSIS — F14.90: ICD-10-CM

## 2022-05-01 DIAGNOSIS — G40.909: ICD-10-CM

## 2022-05-01 DIAGNOSIS — F17.200: ICD-10-CM

## 2022-05-01 LAB
ALBUMIN FLD-MCNC: 3.8 G/DL (ref 3.4–5)
ANION GAP SERPL CALCULATED.3IONS-SCNC: 14.9 MMOL/L (ref 8–16)
AST SERPL-CCNC: 152 U/L (ref 15–37)
BARBITURATES UR QL SCN: NEGATIVE NG/ML
BASOPHILS # BLD AUTO: 0 K/UL (ref 0–0.22)
BASOPHILS NFR BLD AUTO: 0.7 % (ref 0–2)
BENZODIAZ UR QL SCN: POSITIVE NG/ML
BILIRUB SERPL-MCNC: 0.5 MG/DL (ref 0–1)
BUN SERPL-MCNC: 4 MG/DL (ref 7–18)
BZE UR QL SCN: POSITIVE NG/ML
CANNABINOIDS UR QL SCN: NEGATIVE NG/ML
CHLORIDE SERPL-SCNC: 101 MMOL/L (ref 98–107)
CO2 SERPL-SCNC: 25.8 MMOL/L (ref 21–32)
CREAT SERPL-MCNC: 0.6 MG/DL (ref 0.6–1.3)
EOSINOPHIL # BLD AUTO: 0.2 K/UL (ref 0–0.4)
EOSINOPHIL NFR BLD AUTO: 2.7 % (ref 0–4)
ERYTHROCYTE [DISTWIDTH] IN BLOOD BY AUTOMATED COUNT: 21.1 % (ref 11.6–13.7)
GFR SERPL CREATININE-BSD FRML MDRD: 193 ML/MIN (ref 90–?)
GLUCOSE SERPL-MCNC: 108 MG/DL (ref 74–106)
HCT VFR BLD AUTO: 47.3 % (ref 36–52)
HGB BLD-MCNC: 15.1 G/DL (ref 12–18)
LIPASE SERPL-CCNC: 157 U/L (ref 73–393)
LYMPHOCYTES # BLD AUTO: 2.9 K/UL (ref 2–11.5)
LYMPHOCYTES NFR BLD AUTO: 39.7 % (ref 20.5–51.1)
MAGNESIUM SERPL-MCNC: 2.3 MG/DL (ref 1.8–2.4)
MCH RBC QN AUTO: 23 PG (ref 27–31)
MCHC RBC AUTO-ENTMCNC: 32 G/DL (ref 33–37)
MCV RBC AUTO: 71.8 FL (ref 80–94)
MONOCYTES # BLD AUTO: 0.5 K/UL (ref 0.8–1)
MONOCYTES NFR BLD AUTO: 7.4 % (ref 1.7–9.3)
NEUTROPHILS # BLD AUTO: 3.7 K/UL (ref 1.8–7.7)
NEUTROPHILS NFR BLD AUTO: 49.5 % (ref 42.2–75.2)
OPIATES UR QL SCN: NEGATIVE NG/ML
PCP UR QL SCN: NEGATIVE NG/ML
PHOSPHATE SERPL-MCNC: 4.6 MG/DL (ref 2.5–4.9)
PLATELET # BLD AUTO: 219 K/UL (ref 140–450)
POTASSIUM SERPL-SCNC: 3.7 MMOL/L (ref 3.5–5.1)
RBC # BLD AUTO: 6.59 MIL/UL (ref 4.2–6.1)
SODIUM SERPL-SCNC: 138 MMOL/L (ref 136–145)
WBC # BLD AUTO: 7.4 K/UL (ref 4.8–10.8)

## 2022-05-01 PROCEDURE — 83735 ASSAY OF MAGNESIUM: CPT

## 2022-05-01 PROCEDURE — 80305 DRUG TEST PRSMV DIR OPT OBS: CPT

## 2022-05-01 PROCEDURE — 85025 COMPLETE CBC W/AUTO DIFF WBC: CPT

## 2022-05-01 PROCEDURE — 80053 COMPREHEN METABOLIC PANEL: CPT

## 2022-05-01 PROCEDURE — 36415 COLL VENOUS BLD VENIPUNCTURE: CPT

## 2022-05-01 PROCEDURE — 84100 ASSAY OF PHOSPHORUS: CPT

## 2022-05-01 PROCEDURE — 99284 EMERGENCY DEPT VISIT MOD MDM: CPT

## 2022-05-01 PROCEDURE — G0482 DRUG TEST DEF 15-21 CLASSES: HCPCS

## 2022-05-01 PROCEDURE — 96372 THER/PROPH/DIAG INJ SC/IM: CPT

## 2022-05-01 PROCEDURE — 83690 ASSAY OF LIPASE: CPT

## 2022-05-01 NOTE — NUR
Patient discharged with v/s stable BY ERMD. Written and verbal after care 
instructions given and explained. 

Patient verbalized understanding. Ambulatory with steady gait. All questions 
addressed prior to discharge. Advised to follow up with PMD.

## 2022-05-01 NOTE — NUR
36 Y/O  M  BIBS C/O  SEIZURE 3 HOURS AGO. PT STATES, "I'M DETOXING" "I HAD A 
SEIZURE ABOUT 30 MINUTES AGO" "MY PARENTS GAVE ME BEER WHEN I STARTED HAVING A 
SEIZURE" "I BINGE DRINK AND I'VE BEEN DRINKING FOR 3 MONTHS. I'M ALSO DETOXING 
FROM BENZOS AND COCAINE



PMH: SEIZURE

MEDS: KAITLYNN CHANEY